# Patient Record
Sex: FEMALE | Race: WHITE | Employment: UNEMPLOYED | URBAN - METROPOLITAN AREA
[De-identification: names, ages, dates, MRNs, and addresses within clinical notes are randomized per-mention and may not be internally consistent; named-entity substitution may affect disease eponyms.]

---

## 2019-01-15 ENCOUNTER — HOSPITAL ENCOUNTER (OUTPATIENT)
Dept: LAB | Age: 44
Discharge: HOME | End: 2019-01-15

## 2019-01-15 LAB — SENTARA SPECIMEN COL,SENBCF: NORMAL

## 2019-01-15 PROCEDURE — 99001 SPECIMEN HANDLING PT-LAB: CPT

## 2019-08-22 ENCOUNTER — HOSPITAL ENCOUNTER (OUTPATIENT)
Age: 44
Discharge: HOME | End: 2019-08-22
Attending: PHYSICIAN ASSISTANT
Payer: COMMERCIAL

## 2019-08-22 DIAGNOSIS — R41.3 MEMORY LOSS, SHORT TERM: ICD-10-CM

## 2019-08-22 PROCEDURE — 74011636320 HC RX REV CODE- 636/320

## 2019-08-22 PROCEDURE — 70553 MRI BRAIN STEM W/O & W/DYE: CPT

## 2019-08-22 PROCEDURE — A9575 INJ GADOTERATE MEGLUMI 0.1ML: HCPCS

## 2019-08-22 RX ADMIN — GADOTERATE MEGLUMINE 15 ML: 376.9 INJECTION INTRAVENOUS at 10:00

## 2020-06-19 ENCOUNTER — HOSPITAL ENCOUNTER (OUTPATIENT)
Dept: VASCULAR SURGERY | Age: 45
Discharge: HOME | End: 2020-06-19
Attending: PHYSICIAN ASSISTANT
Payer: COMMERCIAL

## 2020-06-19 DIAGNOSIS — M79.645 FINGER PAIN, LEFT: ICD-10-CM

## 2020-06-19 PROCEDURE — 93970 EXTREMITY STUDY: CPT

## 2021-09-17 ENCOUNTER — TRANSCRIBE ORDER (OUTPATIENT)
Dept: SCHEDULING | Age: 46
End: 2021-09-17

## 2021-09-17 DIAGNOSIS — Z12.31 ENCOUNTER FOR SCREENING MAMMOGRAM FOR MALIGNANT NEOPLASM OF BREAST: Primary | ICD-10-CM

## 2023-05-12 RX ORDER — HYDROCODONE BITARTRATE AND ACETAMINOPHEN 7.5; 325 MG/1; MG/1
1-2 TABLET ORAL EVERY 6 HOURS PRN
COMMUNITY
Start: 2015-05-05

## 2023-05-12 RX ORDER — LEVOTHYROXINE SODIUM 0.1 MG/1
TABLET ORAL
COMMUNITY

## 2023-05-12 RX ORDER — DEXTROAMPHETAMINE SACCHARATE, AMPHETAMINE ASPARTATE, DEXTROAMPHETAMINE SULFATE AND AMPHETAMINE SULFATE 7.5; 7.5; 7.5; 7.5 MG/1; MG/1; MG/1; MG/1
30 TABLET ORAL
COMMUNITY

## 2024-05-20 ENCOUNTER — APPOINTMENT (OUTPATIENT)
Facility: HOSPITAL | Age: 49
DRG: 309 | End: 2024-05-20
Payer: COMMERCIAL

## 2024-05-20 ENCOUNTER — HOSPITAL ENCOUNTER (INPATIENT)
Facility: HOSPITAL | Age: 49
LOS: 2 days | Discharge: HOME | DRG: 309 | End: 2024-05-22
Attending: FAMILY MEDICINE | Admitting: FAMILY MEDICINE
Payer: COMMERCIAL

## 2024-05-20 DIAGNOSIS — I20.9 ANGINA PECTORIS (HCC): ICD-10-CM

## 2024-05-20 DIAGNOSIS — I42.3 ENDOMYOCARDIAL FIBROSIS (HCC): ICD-10-CM

## 2024-05-20 DIAGNOSIS — I49.8 BIGEMINY: Primary | ICD-10-CM

## 2024-05-20 DIAGNOSIS — R07.9 CHEST PAIN, UNSPECIFIED TYPE: ICD-10-CM

## 2024-05-20 PROBLEM — I49.9 ARRHYTHMIA: Status: ACTIVE | Noted: 2024-05-20

## 2024-05-20 LAB
ALBUMIN SERPL-MCNC: 4 G/DL (ref 3.4–5)
ALBUMIN/GLOB SERPL: 1.3 (ref 0.8–1.7)
ALP SERPL-CCNC: 87 U/L (ref 45–117)
ALT SERPL-CCNC: 27 U/L (ref 13–56)
ANION GAP SERPL CALC-SCNC: 9 MMOL/L (ref 3–18)
AST SERPL-CCNC: 26 U/L (ref 10–38)
BASOPHILS # BLD: 0.1 K/UL (ref 0–0.1)
BASOPHILS NFR BLD: 1 % (ref 0–2)
BILIRUB SERPL-MCNC: 0.6 MG/DL (ref 0.2–1)
BUN SERPL-MCNC: 15 MG/DL (ref 7–18)
BUN/CREAT SERPL: 17 (ref 12–20)
CALCIUM SERPL-MCNC: 9.7 MG/DL (ref 8.5–10.1)
CHLORIDE SERPL-SCNC: 106 MMOL/L (ref 100–111)
CO2 SERPL-SCNC: 25 MMOL/L (ref 21–32)
CREAT SERPL-MCNC: 0.88 MG/DL (ref 0.6–1.3)
D DIMER PPP FEU-MCNC: 0.86 UG/ML(FEU)
DIFFERENTIAL METHOD BLD: ABNORMAL
ECHO AO ASC DIAM: 3 CM
ECHO AO ASCENDING AORTA INDEX: 1.55 CM/M2
ECHO AO ROOT DIAM: 3.3 CM
ECHO AO ROOT INDEX: 1.7 CM/M2
ECHO AV AREA PEAK VELOCITY: 3.7 CM2
ECHO AV AREA VTI: 3.5 CM2
ECHO AV AREA/BSA PEAK VELOCITY: 1.9 CM2/M2
ECHO AV AREA/BSA VTI: 1.8 CM2/M2
ECHO AV MEAN GRADIENT: 3 MMHG
ECHO AV MEAN VELOCITY: 0.8 M/S
ECHO AV PEAK GRADIENT: 5 MMHG
ECHO AV PEAK VELOCITY: 1.1 M/S
ECHO AV VELOCITY RATIO: 1.18
ECHO AV VTI: 24.4 CM
ECHO BSA: 1.94 M2
ECHO LA DIAMETER INDEX: 1.7 CM/M2
ECHO LA DIAMETER: 3.3 CM
ECHO LA TO AORTIC ROOT RATIO: 1
ECHO LA VOL A-L A2C: 41 ML (ref 22–52)
ECHO LA VOL A-L A4C: 49 ML (ref 22–52)
ECHO LA VOL BP: 43 ML (ref 22–52)
ECHO LA VOL MOD A2C: 39 ML (ref 22–52)
ECHO LA VOL MOD A4C: 48 ML (ref 22–52)
ECHO LA VOL/BSA BIPLANE: 22 ML/M2 (ref 16–34)
ECHO LA VOLUME AREA LENGTH: 46 ML
ECHO LA VOLUME INDEX A-L A2C: 21 ML/M2 (ref 16–34)
ECHO LA VOLUME INDEX A-L A4C: 25 ML/M2 (ref 16–34)
ECHO LA VOLUME INDEX AREA LENGTH: 24 ML/M2 (ref 16–34)
ECHO LA VOLUME INDEX MOD A2C: 20 ML/M2 (ref 16–34)
ECHO LA VOLUME INDEX MOD A4C: 25 ML/M2 (ref 16–34)
ECHO LV E' LATERAL VELOCITY: 15 CM/S
ECHO LV E' SEPTAL VELOCITY: 13 CM/S
ECHO LV EDV A2C: 72 ML
ECHO LV EDV A4C: 106 ML
ECHO LV EDV BP: 90 ML (ref 56–104)
ECHO LV EDV INDEX A4C: 55 ML/M2
ECHO LV EDV INDEX BP: 46 ML/M2
ECHO LV EDV NDEX A2C: 37 ML/M2
ECHO LV EJECTION FRACTION A2C: 67 %
ECHO LV EJECTION FRACTION A4C: 60 %
ECHO LV EJECTION FRACTION BIPLANE: 64 % (ref 55–100)
ECHO LV ESV A2C: 24 ML
ECHO LV ESV A4C: 43 ML
ECHO LV ESV BP: 32 ML (ref 19–49)
ECHO LV ESV INDEX A2C: 12 ML/M2
ECHO LV ESV INDEX A4C: 22 ML/M2
ECHO LV ESV INDEX BP: 16 ML/M2
ECHO LV FRACTIONAL SHORTENING: 38 % (ref 28–44)
ECHO LV INTERNAL DIMENSION DIASTOLE INDEX: 2.32 CM/M2
ECHO LV INTERNAL DIMENSION DIASTOLIC: 4.5 CM (ref 3.9–5.3)
ECHO LV INTERNAL DIMENSION SYSTOLIC INDEX: 1.44 CM/M2
ECHO LV INTERNAL DIMENSION SYSTOLIC: 2.8 CM
ECHO LV IVSD: 0.9 CM (ref 0.6–0.9)
ECHO LV MASS 2D: 142.9 G (ref 67–162)
ECHO LV MASS INDEX 2D: 73.7 G/M2 (ref 43–95)
ECHO LV POSTERIOR WALL DIASTOLIC: 1 CM (ref 0.6–0.9)
ECHO LV RELATIVE WALL THICKNESS RATIO: 0.44
ECHO LVOT AREA: 3.5 CM2
ECHO LVOT AV VTI INDEX: 1.05
ECHO LVOT DIAM: 2.1 CM
ECHO LVOT MEAN GRADIENT: 3 MMHG
ECHO LVOT PEAK GRADIENT: 6 MMHG
ECHO LVOT PEAK VELOCITY: 1.3 M/S
ECHO LVOT STROKE VOLUME INDEX: 45.9 ML/M2
ECHO LVOT SV: 89 ML
ECHO LVOT VTI: 25.7 CM
ECHO MV A VELOCITY: 0.51 M/S
ECHO MV E DECELERATION TIME (DT): 154.4 MS
ECHO MV E VELOCITY: 0.68 M/S
ECHO MV E/A RATIO: 1.33
ECHO MV E/E' LATERAL: 4.53
ECHO MV E/E' RATIO (AVERAGED): 4.88
ECHO MV E/E' SEPTAL: 5.23
ECHO RA END SYSTOLIC VOLUME APICAL 4 CHAMBER INDEX BSA: 19 ML/M2
ECHO RA VOLUME: 37 ML
ECHO RV INTERNAL DIMENSION: 3.2 CM
ECHO RV TAPSE: 2.7 CM (ref 1.7–?)
ECHO RVOT MEAN GRADIENT: 1 MMHG
ECHO RVOT PEAK GRADIENT: 2 MMHG
ECHO RVOT PEAK VELOCITY: 0.7 M/S
ECHO RVOT VTI: 17.2 CM
EOSINOPHIL # BLD: 0.1 K/UL (ref 0–0.4)
EOSINOPHIL NFR BLD: 2 % (ref 0–5)
ERYTHROCYTE [DISTWIDTH] IN BLOOD BY AUTOMATED COUNT: 13.8 % (ref 11.6–14.5)
GLOBULIN SER CALC-MCNC: 3.2 G/DL (ref 2–4)
GLUCOSE BLD STRIP.AUTO-MCNC: 89 MG/DL (ref 70–110)
GLUCOSE SERPL-MCNC: 98 MG/DL (ref 74–99)
HCT VFR BLD AUTO: 40.1 % (ref 35–45)
HGB BLD-MCNC: 13.4 G/DL (ref 12–16)
IMM GRANULOCYTES # BLD AUTO: 0 K/UL (ref 0–0.04)
IMM GRANULOCYTES NFR BLD AUTO: 0 % (ref 0–0.5)
LYMPHOCYTES # BLD: 3 K/UL (ref 0.9–3.6)
LYMPHOCYTES NFR BLD: 49 % (ref 21–52)
MAGNESIUM SERPL-MCNC: 1.7 MG/DL (ref 1.6–2.6)
MCH RBC QN AUTO: 28.6 PG (ref 24–34)
MCHC RBC AUTO-ENTMCNC: 33.4 G/DL (ref 31–37)
MCV RBC AUTO: 85.7 FL (ref 78–100)
MONOCYTES # BLD: 0.7 K/UL (ref 0.05–1.2)
MONOCYTES NFR BLD: 11 % (ref 3–10)
NEUTS SEG # BLD: 2.3 K/UL (ref 1.8–8)
NEUTS SEG NFR BLD: 37 % (ref 40–73)
NRBC # BLD: 0 K/UL (ref 0–0.01)
NRBC BLD-RTO: 0 PER 100 WBC
PLATELET # BLD AUTO: 324 K/UL (ref 135–420)
PMV BLD AUTO: 9 FL (ref 9.2–11.8)
POTASSIUM SERPL-SCNC: 3.2 MMOL/L (ref 3.5–5.5)
PROT SERPL-MCNC: 7.2 G/DL (ref 6.4–8.2)
RBC # BLD AUTO: 4.68 M/UL (ref 4.2–5.3)
SODIUM SERPL-SCNC: 140 MMOL/L (ref 136–145)
TROPONIN I SERPL HS-MCNC: 4 NG/L (ref 0–54)
TROPONIN I SERPL HS-MCNC: <3 NG/L (ref 0–54)
TROPONIN I SERPL HS-MCNC: <3 NG/L (ref 0–54)
TSH SERPL DL<=0.05 MIU/L-ACNC: 0.82 UIU/ML (ref 0.36–3.74)
WBC # BLD AUTO: 6.1 K/UL (ref 4.6–13.2)

## 2024-05-20 PROCEDURE — 36415 COLL VENOUS BLD VENIPUNCTURE: CPT

## 2024-05-20 PROCEDURE — 83735 ASSAY OF MAGNESIUM: CPT

## 2024-05-20 PROCEDURE — 71046 X-RAY EXAM CHEST 2 VIEWS: CPT

## 2024-05-20 PROCEDURE — 82962 GLUCOSE BLOOD TEST: CPT

## 2024-05-20 PROCEDURE — 1100000003 HC PRIVATE W/ TELEMETRY

## 2024-05-20 PROCEDURE — 93306 TTE W/DOPPLER COMPLETE: CPT

## 2024-05-20 PROCEDURE — 80053 COMPREHEN METABOLIC PANEL: CPT

## 2024-05-20 PROCEDURE — 85379 FIBRIN DEGRADATION QUANT: CPT

## 2024-05-20 PROCEDURE — 80061 LIPID PANEL: CPT

## 2024-05-20 PROCEDURE — 84484 ASSAY OF TROPONIN QUANT: CPT

## 2024-05-20 PROCEDURE — 84443 ASSAY THYROID STIM HORMONE: CPT

## 2024-05-20 PROCEDURE — 6370000000 HC RX 637 (ALT 250 FOR IP): Performed by: PHYSICIAN ASSISTANT

## 2024-05-20 PROCEDURE — 84436 ASSAY OF TOTAL THYROXINE: CPT

## 2024-05-20 PROCEDURE — 2580000003 HC RX 258: Performed by: FAMILY MEDICINE

## 2024-05-20 PROCEDURE — 6370000000 HC RX 637 (ALT 250 FOR IP): Performed by: FAMILY MEDICINE

## 2024-05-20 PROCEDURE — 85025 COMPLETE CBC W/AUTO DIFF WBC: CPT

## 2024-05-20 PROCEDURE — 6360000004 HC RX CONTRAST MEDICATION: Performed by: PHYSICIAN ASSISTANT

## 2024-05-20 PROCEDURE — 99285 EMERGENCY DEPT VISIT HI MDM: CPT

## 2024-05-20 PROCEDURE — 93005 ELECTROCARDIOGRAM TRACING: CPT | Performed by: FAMILY MEDICINE

## 2024-05-20 PROCEDURE — 71275 CT ANGIOGRAPHY CHEST: CPT

## 2024-05-20 PROCEDURE — 6360000002 HC RX W HCPCS: Performed by: FAMILY MEDICINE

## 2024-05-20 PROCEDURE — 93005 ELECTROCARDIOGRAM TRACING: CPT | Performed by: PHYSICIAN ASSISTANT

## 2024-05-20 RX ORDER — ACETAMINOPHEN 325 MG/1
650 TABLET ORAL EVERY 6 HOURS PRN
Status: DISCONTINUED | OUTPATIENT
Start: 2024-05-20 | End: 2024-05-22 | Stop reason: HOSPADM

## 2024-05-20 RX ORDER — ACETAMINOPHEN 650 MG/1
650 SUPPOSITORY RECTAL EVERY 6 HOURS PRN
Status: DISCONTINUED | OUTPATIENT
Start: 2024-05-20 | End: 2024-05-22 | Stop reason: HOSPADM

## 2024-05-20 RX ORDER — MAGNESIUM SULFATE IN WATER 40 MG/ML
2000 INJECTION, SOLUTION INTRAVENOUS PRN
Status: DISCONTINUED | OUTPATIENT
Start: 2024-05-20 | End: 2024-05-22 | Stop reason: HOSPADM

## 2024-05-20 RX ORDER — ATORVASTATIN CALCIUM 20 MG/1
40 TABLET, FILM COATED ORAL NIGHTLY
Status: DISCONTINUED | OUTPATIENT
Start: 2024-05-20 | End: 2024-05-22 | Stop reason: HOSPADM

## 2024-05-20 RX ORDER — POLYETHYLENE GLYCOL 3350 17 G/17G
17 POWDER, FOR SOLUTION ORAL DAILY PRN
Status: DISCONTINUED | OUTPATIENT
Start: 2024-05-20 | End: 2024-05-22 | Stop reason: HOSPADM

## 2024-05-20 RX ORDER — POTASSIUM CHLORIDE 20 MEQ/1
40 TABLET, EXTENDED RELEASE ORAL PRN
Status: DISCONTINUED | OUTPATIENT
Start: 2024-05-20 | End: 2024-05-22 | Stop reason: HOSPADM

## 2024-05-20 RX ORDER — ONDANSETRON 2 MG/ML
4 INJECTION INTRAMUSCULAR; INTRAVENOUS EVERY 6 HOURS PRN
Status: DISCONTINUED | OUTPATIENT
Start: 2024-05-20 | End: 2024-05-22 | Stop reason: HOSPADM

## 2024-05-20 RX ORDER — POTASSIUM CHLORIDE 7.45 MG/ML
10 INJECTION INTRAVENOUS PRN
Status: DISCONTINUED | OUTPATIENT
Start: 2024-05-20 | End: 2024-05-22 | Stop reason: HOSPADM

## 2024-05-20 RX ORDER — SODIUM CHLORIDE 0.9 % (FLUSH) 0.9 %
5-40 SYRINGE (ML) INJECTION PRN
Status: DISCONTINUED | OUTPATIENT
Start: 2024-05-20 | End: 2024-05-22 | Stop reason: HOSPADM

## 2024-05-20 RX ORDER — MORPHINE SULFATE 4 MG/ML
4 INJECTION, SOLUTION INTRAMUSCULAR; INTRAVENOUS ONCE
Status: DISCONTINUED | OUTPATIENT
Start: 2024-05-20 | End: 2024-05-22 | Stop reason: HOSPADM

## 2024-05-20 RX ORDER — SODIUM CHLORIDE 0.9 % (FLUSH) 0.9 %
5-40 SYRINGE (ML) INJECTION EVERY 12 HOURS SCHEDULED
Status: DISCONTINUED | OUTPATIENT
Start: 2024-05-20 | End: 2024-05-22 | Stop reason: HOSPADM

## 2024-05-20 RX ORDER — NITROGLYCERIN 0.4 MG/1
0.4 TABLET SUBLINGUAL EVERY 5 MIN PRN
Status: DISCONTINUED | OUTPATIENT
Start: 2024-05-20 | End: 2024-05-22 | Stop reason: HOSPADM

## 2024-05-20 RX ORDER — SODIUM CHLORIDE 9 MG/ML
INJECTION, SOLUTION INTRAVENOUS PRN
Status: DISCONTINUED | OUTPATIENT
Start: 2024-05-20 | End: 2024-05-22 | Stop reason: HOSPADM

## 2024-05-20 RX ORDER — ENOXAPARIN SODIUM 100 MG/ML
40 INJECTION SUBCUTANEOUS DAILY
Status: DISCONTINUED | OUTPATIENT
Start: 2024-05-20 | End: 2024-05-22 | Stop reason: HOSPADM

## 2024-05-20 RX ORDER — ASPIRIN 81 MG/1
81 TABLET, CHEWABLE ORAL DAILY
Status: DISCONTINUED | OUTPATIENT
Start: 2024-05-20 | End: 2024-05-22 | Stop reason: HOSPADM

## 2024-05-20 RX ORDER — ONDANSETRON 4 MG/1
4 TABLET, ORALLY DISINTEGRATING ORAL EVERY 8 HOURS PRN
Status: DISCONTINUED | OUTPATIENT
Start: 2024-05-20 | End: 2024-05-22 | Stop reason: HOSPADM

## 2024-05-20 RX ORDER — ASPIRIN 81 MG/1
162 TABLET, CHEWABLE ORAL
Status: COMPLETED | OUTPATIENT
Start: 2024-05-20 | End: 2024-05-20

## 2024-05-20 RX ADMIN — ASPIRIN 81 MG: 81 TABLET, CHEWABLE ORAL at 18:47

## 2024-05-20 RX ADMIN — ENOXAPARIN SODIUM 40 MG: 100 INJECTION SUBCUTANEOUS at 18:47

## 2024-05-20 RX ADMIN — IOPAMIDOL 100 ML: 755 INJECTION, SOLUTION INTRAVENOUS at 12:45

## 2024-05-20 RX ADMIN — ASPIRIN 162 MG: 81 TABLET, CHEWABLE ORAL at 12:58

## 2024-05-20 RX ADMIN — POTASSIUM CHLORIDE 40 MEQ: 1500 TABLET, EXTENDED RELEASE ORAL at 20:23

## 2024-05-20 RX ADMIN — SODIUM CHLORIDE, PRESERVATIVE FREE 10 ML: 5 INJECTION INTRAVENOUS at 20:15

## 2024-05-20 ASSESSMENT — LIFESTYLE VARIABLES
HOW OFTEN DO YOU HAVE A DRINK CONTAINING ALCOHOL: NEVER
HOW MANY STANDARD DRINKS CONTAINING ALCOHOL DO YOU HAVE ON A TYPICAL DAY: 1 OR 2

## 2024-05-20 NOTE — CONSULTS
05/20/2024 10:52 AM    K 3.2 05/20/2024 10:52 AM     05/20/2024 10:52 AM    CO2 25 05/20/2024 10:52 AM    BUN 15 05/20/2024 10:52 AM     No results found for: \"CHOL\", \"CHLST\", \"CHOLV\", \"HDL\", \"HDLC\", \"LDL\"  No components found for: \"GPT\"  No results found for: \"LABA1C\"    RADIOLOGY:  [unfilled]  [unfilled]      Cardiology Procedures: [unfilled] [unfilled] Cardiolite (Tc-99m Sestamibi) stress test        Impression / Plan:    Patient Active Problem List   Diagnosis    Breast mass    Fibrocystic breast changes of both breasts       A/P  Chest pain  Palpitation  Dizziness  Dyspnea on exertion  PVCs  History of paroxysmal tachycardia      Plan.  Check TSH  Will get echocardiogram  Will get exercise/Lexiscan nuclear stress test  Will start metoprolol succinate 25 mg daily after the stress test  Patient may need outpatient Holter monitor depending upon on clinical progression  I have discussed in detail with the patient and family management plan  All of them agreed with the above plan  Thank you for allowing me to participate in the management of this pleasant patient.  Please feel free to contact me if you have any questions or concerns.        Signed By: JASMYN SAUCEDO MD     May 20, 2024

## 2024-05-20 NOTE — H&P
MV E Wave Deceleration Time 154.4 ms    MV E Velocity 0.68 m/s    LV E' Lateral Velocity 15 cm/s    LV E' Septal Velocity 13 cm/s    TAPSE 2.7 1.7 cm    Ascending Aorta 3.0 cm    Aortic Root 3.3 cm    Fractional Shortening 2D 38 28 - 44 %    LV ESV Index BP 16 mL/m2    LV EDV Index BP 46 mL/m2    LV ESV Index A4C 22 mL/m2    LV EDV Index A4C 55 mL/m2    LV ESV Index A2C 12 mL/m2    LV EDV Index A2C 37 mL/m2    LVIDd Index 2.32 cm/m2    LVIDs Index 1.44 cm/m2    LV RWT Ratio 0.44     LV Mass 2D 142.9 67 - 162 g    LV Mass 2D Index 73.7 43 - 95 g/m2    MV E/A 1.33     E/E' Ratio (Averaged) 4.88     E/E' Lateral 4.53     E/E' Septal 5.23     LA Volume Index BP 22 16 - 34 ml/m2    LA Volume Index A/L 24 16 - 34 mL/m2    LVOT Stroke Volume Index 45.9 mL/m2    LVOT Area 3.5 cm2    LA Volume Index A-L A2C 21 16 - 34 mL/m2    LA Volume Index A-L A4C 25 16 - 34 mL/m2    LA Volume Index MOD A2C 20 16 - 34 ml/m2    LA Volume Index MOD A4C 25 16 - 34 ml/m2    LA Size Index 1.70 cm/m2    LA/AO Root Ratio 1.00     Ao Root Index 1.70 cm/m2    Ascending Aorta Index 1.55 cm/m2    AV Velocity Ratio 1.18     LVOT:AV VTI Index 1.05     PEYTON/BSA VTI 1.8 cm2/m2    PEYTON/BSA Peak Velocity 1.9 cm2/m2    RA Volume Index A4C 19 mL/m2   Troponin    Collection Time: 05/20/24  8:33 PM   Result Value Ref Range    Troponin, High Sensitivity 4 0 - 54 ng/L   CBC    Collection Time: 05/21/24  2:21 AM   Result Value Ref Range    WBC 5.3 4.6 - 13.2 K/uL    RBC 4.14 (L) 4.20 - 5.30 M/uL    Hemoglobin 11.9 (L) 12.0 - 16.0 g/dL    Hematocrit 35.9 35.0 - 45.0 %    MCV 86.7 78.0 - 100.0 FL    MCH 28.7 24.0 - 34.0 PG    MCHC 33.1 31.0 - 37.0 g/dL    RDW 14.1 11.6 - 14.5 %    Platelets 275 135 - 420 K/uL    MPV 9.0 (L) 9.2 - 11.8 FL    Nucleated RBCs 0.0 0  WBC    nRBC 0.00 0.00 - 0.01 K/uL   Basic Metabolic Panel w/ Reflex to MG    Collection Time: 05/21/24  2:21 AM   Result Value Ref Range    Sodium 139 136 - 145 mmol/L    Potassium 4.2 3.5 - 5.5

## 2024-05-20 NOTE — ED PROVIDER NOTES
skin trauma  Neurological: neg for headaches  All other systems reviewed negative with exception of positives in ROS and HPI.   Past Medical History:  Past Medical History:   Diagnosis Date    PAT (paroxysmal atrial tachycardia) (HCC)        Past Surgical History:  Past Surgical History:   Procedure Laterality Date    BREAST SURGERY  1996    ORTHOPEDIC SURGERY      SD UNLISTED PROCEDURE ABDOMEN PERITONEUM & OMENTUM      hernia repair as a child        Family History:  History reviewed. No pertinent family history.    Social History:  Social History     Tobacco Use    Smoking status: Former   Substance Use Topics    Alcohol use: Yes       Allergies:  No Known Allergies    Vital Signs:  Vitals:    05/20/24 1300 05/20/24 1330 05/20/24 1400 05/20/24 1415   BP: 138/79 114/81 125/76 122/65   Pulse: 65 57 54 60   Resp: 15 11 13 13   Temp:       SpO2: 100% 98% 100% 100%   Weight:       Height:         Physical Exam:  Vital Signs Reviewed. Nursing Notes Reviewed.  Constitutional:  Well developed, well nourished patient. Appearance and behavior are age and situation appropriate. Anxious appearing.  Head: Normocephalic, Atraumatic  Eyes: Conjunctiva clear, lids normal. Sclera anicteric.   ENT:hearing grossly intact  Neck:  supple, FROM, nontender, no bruit  Lungs: No respiratory distress. Lungs CTAB . No cough. No pain with inspiration.  CV:  RR&R without murmur  Thorax: no chest wall tenderness.No signs of trauma. Skin without rash.  Abdomen: soft, no tenderness with palpation. BS normal throughout. No organomegaly appreciated. No mass.   Extremities:  no tenderness with palpation to UE or LE.  No pedal edema. Normal cap refill, bilat equal.   Neuro:  A&O x 3. CN II-XII grossly intact. No gross neuro deficits.  Skin:  Warm, dry, no rash. Skin intact.   Spine:  No pain with palpation over cervical, thoracic or lumbar spine.    Diagnostics:    Labs -     Recent Results (from the past 12 hour(s))   EKG 12 Lead    Collection

## 2024-05-20 NOTE — ED TRIAGE NOTES
Dizziness (Pt stated that she woke up this morning because of having PVC'S. Pt stated that she was at work and had some Pvcs and was dizzy )     (Shared from Triage, RN Chief Complaint AIDA Mandujano)

## 2024-05-21 ENCOUNTER — HOSPITAL ENCOUNTER (INPATIENT)
Facility: HOSPITAL | Age: 49
Discharge: HOME | DRG: 309 | End: 2024-05-23
Payer: COMMERCIAL

## 2024-05-21 ENCOUNTER — APPOINTMENT (OUTPATIENT)
Facility: HOSPITAL | Age: 49
DRG: 309 | End: 2024-05-21
Payer: COMMERCIAL

## 2024-05-21 PROBLEM — E03.9 HYPOTHYROIDISM: Status: ACTIVE | Noted: 2024-05-20

## 2024-05-21 LAB
ANION GAP SERPL CALC-SCNC: 5 MMOL/L (ref 3–18)
BUN SERPL-MCNC: 16 MG/DL (ref 7–18)
BUN/CREAT SERPL: 17 (ref 12–20)
CALCIUM SERPL-MCNC: 8.5 MG/DL (ref 8.5–10.1)
CHLORIDE SERPL-SCNC: 108 MMOL/L (ref 100–111)
CHOLEST SERPL-MCNC: 151 MG/DL
CO2 SERPL-SCNC: 26 MMOL/L (ref 21–32)
CREAT SERPL-MCNC: 0.93 MG/DL (ref 0.6–1.3)
ECHO BSA: 1.94 M2
ERYTHROCYTE [DISTWIDTH] IN BLOOD BY AUTOMATED COUNT: 14.1 % (ref 11.6–14.5)
GLUCOSE SERPL-MCNC: 98 MG/DL (ref 74–99)
HCT VFR BLD AUTO: 35.9 % (ref 35–45)
HDLC SERPL-MCNC: 78 MG/DL (ref 40–60)
HDLC SERPL: 1.9 (ref 0–5)
HGB BLD-MCNC: 11.9 G/DL (ref 12–16)
LDLC SERPL CALC-MCNC: 62.8 MG/DL (ref 0–100)
LIPID PANEL: ABNORMAL
MAGNESIUM SERPL-MCNC: 1.8 MG/DL (ref 1.6–2.6)
MCH RBC QN AUTO: 28.7 PG (ref 24–34)
MCHC RBC AUTO-ENTMCNC: 33.1 G/DL (ref 31–37)
MCV RBC AUTO: 86.7 FL (ref 78–100)
NRBC # BLD: 0 K/UL (ref 0–0.01)
NRBC BLD-RTO: 0 PER 100 WBC
NUC STRESS EJECTION FRACTION: 62 %
PLATELET # BLD AUTO: 275 K/UL (ref 135–420)
PMV BLD AUTO: 9 FL (ref 9.2–11.8)
POTASSIUM SERPL-SCNC: 4.2 MMOL/L (ref 3.5–5.5)
RBC # BLD AUTO: 4.14 M/UL (ref 4.2–5.3)
SODIUM SERPL-SCNC: 139 MMOL/L (ref 136–145)
STRESS BASELINE ST DEPRESSION: 0 MM
STRESS ST DEPRESSION: 0 MM
STRESS TARGET HR: 171 BPM
T4 SERPL-MCNC: 12 UG/DL (ref 4.8–13.9)
TID: 1.03
TRIGL SERPL-MCNC: 51 MG/DL
VLDLC SERPL CALC-MCNC: 10.2 MG/DL
WBC # BLD AUTO: 5.3 K/UL (ref 4.6–13.2)

## 2024-05-21 PROCEDURE — 83735 ASSAY OF MAGNESIUM: CPT

## 2024-05-21 PROCEDURE — A9500 TC99M SESTAMIBI: HCPCS | Performed by: INTERNAL MEDICINE

## 2024-05-21 PROCEDURE — 93017 CV STRESS TEST TRACING ONLY: CPT

## 2024-05-21 PROCEDURE — 80048 BASIC METABOLIC PNL TOTAL CA: CPT

## 2024-05-21 PROCEDURE — 36415 COLL VENOUS BLD VENIPUNCTURE: CPT

## 2024-05-21 PROCEDURE — 6360000002 HC RX W HCPCS: Performed by: FAMILY MEDICINE

## 2024-05-21 PROCEDURE — 2580000003 HC RX 258: Performed by: FAMILY MEDICINE

## 2024-05-21 PROCEDURE — 85027 COMPLETE CBC AUTOMATED: CPT

## 2024-05-21 PROCEDURE — 6370000000 HC RX 637 (ALT 250 FOR IP): Performed by: FAMILY MEDICINE

## 2024-05-21 PROCEDURE — 3430000000 HC RX DIAGNOSTIC RADIOPHARMACEUTICAL: Performed by: INTERNAL MEDICINE

## 2024-05-21 PROCEDURE — 1100000003 HC PRIVATE W/ TELEMETRY

## 2024-05-21 RX ORDER — LEVOTHYROXINE SODIUM 0.1 MG/1
100 TABLET ORAL DAILY
Status: DISCONTINUED | OUTPATIENT
Start: 2024-05-21 | End: 2024-05-22 | Stop reason: HOSPADM

## 2024-05-21 RX ORDER — TETRAKIS(2-METHOXYISOBUTYLISOCYANIDE)COPPER(I) TETRAFLUOROBORATE 1 MG/ML
36 INJECTION, POWDER, LYOPHILIZED, FOR SOLUTION INTRAVENOUS
Status: COMPLETED | OUTPATIENT
Start: 2024-05-21 | End: 2024-05-21

## 2024-05-21 RX ORDER — REGADENOSON 0.08 MG/ML
0.4 INJECTION, SOLUTION INTRAVENOUS
Status: DISCONTINUED | OUTPATIENT
Start: 2024-05-21 | End: 2024-05-21

## 2024-05-21 RX ORDER — TETRAKIS(2-METHOXYISOBUTYLISOCYANIDE)COPPER(I) TETRAFLUOROBORATE 1 MG/ML
11.6 INJECTION, POWDER, LYOPHILIZED, FOR SOLUTION INTRAVENOUS
Status: COMPLETED | OUTPATIENT
Start: 2024-05-21 | End: 2024-05-21

## 2024-05-21 RX ADMIN — TETRAKIS(2-METHOXYISOBUTYLISOCYANIDE)COPPER(I) TETRAFLUOROBORATE 11.6 MILLICURIE: 1 INJECTION, POWDER, LYOPHILIZED, FOR SOLUTION INTRAVENOUS at 08:48

## 2024-05-21 RX ADMIN — ASPIRIN 81 MG: 81 TABLET, CHEWABLE ORAL at 07:40

## 2024-05-21 RX ADMIN — ENOXAPARIN SODIUM 40 MG: 100 INJECTION SUBCUTANEOUS at 08:30

## 2024-05-21 RX ADMIN — LEVOTHYROXINE SODIUM 100 MCG: 0.1 TABLET ORAL at 06:45

## 2024-05-21 RX ADMIN — TETRAKIS(2-METHOXYISOBUTYLISOCYANIDE)COPPER(I) TETRAFLUOROBORATE 36 MILLICURIE: 1 INJECTION, POWDER, LYOPHILIZED, FOR SOLUTION INTRAVENOUS at 10:38

## 2024-05-21 RX ADMIN — SODIUM CHLORIDE, PRESERVATIVE FREE 10 ML: 5 INJECTION INTRAVENOUS at 07:41

## 2024-05-21 ASSESSMENT — PAIN SCALES - GENERAL
PAINLEVEL_OUTOF10: 0

## 2024-05-21 NOTE — PLAN OF CARE
Problem: ABCDS Injury Assessment  Goal: Absence of physical injury  Outcome: Progressing     Problem: Discharge Planning  Goal: Discharge to home or other facility with appropriate resources  Outcome: Progressing  Flowsheets  Taken 5/20/2024 1915 by Louisa Coyle RN  Discharge to home or other facility with appropriate resources: Identify barriers to discharge with patient and caregiver  Taken 5/20/2024 1804 by Josie Caro RN  Discharge to home or other facility with appropriate resources:   Identify barriers to discharge with patient and caregiver   Arrange for needed discharge resources and transportation as appropriate     Problem: Safety - Adult  Goal: Free from fall injury  Outcome: Progressing

## 2024-05-21 NOTE — PROGRESS NOTES
Pt has several family members present at bedside. Pt is sitting comfortably in the chair at this time with no complaints. Pt and family currently have questions for Cardiology. They state that they have \"not seen anyone today at all\" other than during at the stress test and the current unit staff. They would like to know details of test results and pt is eager to DC and states she is hoping to go to work tomorrow. Charge RN and myself provided education and addressed the concerns of pt to the best of our knowledge. We reassured pt and family that they will see physicians in the am and that we will keep them posted with any updates.

## 2024-05-21 NOTE — PROGRESS NOTES
Bedside and Verbal shift change report given to AIDA Jasso (oncoming nurse) by AIDA Galindo (offgoing nurse). Report included the following information Nurse Handoff Report, Adult Overview, Intake/Output, and MAR.

## 2024-05-21 NOTE — PROGRESS NOTES
Bedside and Verbal shift change report given to AIDA Regalado (oncoming nurse) by AIDA Jasso (offgoing nurse). Report included the following information Nurse Handoff Report, Index, ED Encounter Summary, ED SBAR, Adult Overview, Intake/Output, MAR, Recent Results, and Med Rec Status.

## 2024-05-21 NOTE — PLAN OF CARE
Problem: ABCDS Injury Assessment  Goal: Absence of physical injury  5/21/2024 0940 by Sabine Noonan RN  Outcome: Progressing  5/20/2024 2200 by Louisa Coyle RN  Outcome: Progressing     Problem: Discharge Planning  Goal: Discharge to home or other facility with appropriate resources  5/21/2024 0940 by Sabine Noonan RN  Outcome: Progressing  Flowsheets (Taken 5/21/2024 0740)  Discharge to home or other facility with appropriate resources:   Identify barriers to discharge with patient and caregiver   Arrange for needed discharge resources and transportation as appropriate  5/20/2024 2200 by Louisa Coyle RN  Outcome: Progressing  Flowsheets  Taken 5/20/2024 1915 by Louisa Coyle RN  Discharge to home or other facility with appropriate resources: Identify barriers to discharge with patient and caregiver  Taken 5/20/2024 1804 by Josie Caro RN  Discharge to home or other facility with appropriate resources:   Identify barriers to discharge with patient and caregiver   Arrange for needed discharge resources and transportation as appropriate     Problem: Safety - Adult  Goal: Free from fall injury  5/21/2024 0940 by Sabine Noonan RN  Outcome: Progressing  5/20/2024 2200 by Loiusa Coyle RN  Outcome: Progressing     Problem: Pain  Goal: Verbalizes/displays adequate comfort level or baseline comfort level  Outcome: Progressing      Statement Selected

## 2024-05-21 NOTE — PROGRESS NOTES
Hospitalist Progress Note    Patient: Carrie Carson MRN: [id number]  CSN: [id number]    YOB: 1975  Age: 48 y.o.  Sex: female    DOA: 5/20/2024 LOS:  LOS: 1 day                Assessment/Plan     Active Hospital Problems    Diagnosis     Arrhythmia [I49.9]     Hypothyroidism [E03.9]         Chief complaint :  Palpitations    Palpitations -  EKG with frequent PVCs in bigeminy pattern.  Cardiology following  Follow echo and stress test  Further recommendation per cardiology    Hypothyroidism -  Continue synthroid  TSH, T4 in normal range.    DVT prophylaxis with lovenox    Disposition : 1-2 days    Review of systems  General: No fevers or chills.  Cardiovascular: No chest pain or pressure. No palpitations.   Pulmonary: No shortness of breath.   Gastrointestinal: No nausea, vomiting.     Physical Exam:  General: Awake, cooperative, no acute distress    HEENT: NC, Atraumatic.  PERRLA, anicteric sclerae.  Lungs: CTA Bilaterally. No Wheezing/Rhonchi/Rales.  Heart:  S1 S2,  No murmur, No Rubs, No Gallops  Abdomen: Soft, Non distended, Non tender.  +Bowel sounds,   Extremities: No c/c/e  Psych:   Not anxious or agitated.  Neurologic:  No acute neurological deficit.         Vital signs/Intake and Output:  Visit Vitals  /69   Pulse 65   Temp 98.2 °F (36.8 °C) (Temporal)   Resp 18   Ht 1.803 m (5' 11\")   Wt 74.8 kg (165 lb)   SpO2 98%   BMI 23.01 kg/m²     Current Shift:  No intake/output data recorded.  Last three shifts:  No intake/output data recorded.            Labs: Results:       Chemistry Recent Labs     05/20/24 1052 05/21/24 0221    139   K 3.2* 4.2    108   CO2 25 26   BUN 15 16   GLOB 3.2  --    ,No results found for: \"LACTA\"   CBC w/Diff Recent Labs     05/20/24 1052 05/21/24 0221   WBC 6.1 5.3   RBC 4.68 4.14*   HGB 13.4 11.9*   HCT 40.1 35.9    275      Cardiac Enzymes Lab Results   Component Value Date    TROPHS 4 05/20/2024   ,No results found for: \"BNP\"

## 2024-05-21 NOTE — CARE COORDINATION
05/21/24 0914   Service Assessment   Patient Orientation Alert and Oriented   Cognition Alert   History Provided By Patient   Primary Caregiver Self   Accompanied By/Relationship N/A   Support Systems Spouse/Significant Other   PCP Verified by CM Yes   Last Visit to PCP Within last 6 months   Prior Functional Level Independent in ADLs/IADLs   Current Functional Level Independent in ADLs/IADLs   Can patient return to prior living arrangement Yes   Ability to make needs known: Good   Family able to assist with home care needs: Yes   Would you like for me to discuss the discharge plan with any other family members/significant others, and if so, who? Yes  (Significant other)   Financial Resources None   Community Resources None   Social/Functional History   Lives With Significant other   Type of Home House   Home Equipment None   ADL Assistance Independent   Homemaking Assistance Independent   Ambulation Assistance Independent   Transfer Assistance Independent   Active  Yes   Mode of Transportation Car   Occupation Full time employment   Discharge Planning   Type of Residence House   Living Arrangements Spouse/Significant Other   Current Services Prior To Admission None   Potential Assistance Needed N/A   DME Ordered? No   Potential Assistance Purchasing Medications No   Type of Home Care Services None   Patient expects to be discharged to: House   History of falls? 0   Services At/After Discharge   Transition of Care Consult (CM Consult) Discharge Planning   Services At/After Discharge None   [place] Resource Information Provided? No   Mode of Transport at Discharge Other (see comment)  (Family)   Confirm Follow Up Transport Self   Condition of Participation: Discharge Planning   The Plan for Transition of Care is related to the following treatment goals: The patient states after her stress test she would like to discharge home   The Patient and/or Patient Representative was provided with a Choice of Provider?

## 2024-05-22 VITALS
BODY MASS INDEX: 23.1 KG/M2 | HEART RATE: 65 BPM | HEIGHT: 71 IN | WEIGHT: 165 LBS | DIASTOLIC BLOOD PRESSURE: 72 MMHG | SYSTOLIC BLOOD PRESSURE: 114 MMHG | TEMPERATURE: 97.2 F | RESPIRATION RATE: 18 BRPM | OXYGEN SATURATION: 100 %

## 2024-05-22 LAB
ANION GAP SERPL CALC-SCNC: 8 MMOL/L (ref 3–18)
BUN SERPL-MCNC: 12 MG/DL (ref 7–18)
BUN/CREAT SERPL: 16 (ref 12–20)
CALCIUM SERPL-MCNC: 8.6 MG/DL (ref 8.5–10.1)
CHLORIDE SERPL-SCNC: 107 MMOL/L (ref 100–111)
CO2 SERPL-SCNC: 24 MMOL/L (ref 21–32)
CREAT SERPL-MCNC: 0.74 MG/DL (ref 0.6–1.3)
GLUCOSE SERPL-MCNC: 93 MG/DL (ref 74–99)
POTASSIUM SERPL-SCNC: 4.3 MMOL/L (ref 3.5–5.5)
SODIUM SERPL-SCNC: 139 MMOL/L (ref 136–145)

## 2024-05-22 PROCEDURE — 80048 BASIC METABOLIC PNL TOTAL CA: CPT

## 2024-05-22 PROCEDURE — 6370000000 HC RX 637 (ALT 250 FOR IP): Performed by: FAMILY MEDICINE

## 2024-05-22 PROCEDURE — 2580000003 HC RX 258: Performed by: FAMILY MEDICINE

## 2024-05-22 PROCEDURE — 36415 COLL VENOUS BLD VENIPUNCTURE: CPT

## 2024-05-22 PROCEDURE — 6360000002 HC RX W HCPCS: Performed by: FAMILY MEDICINE

## 2024-05-22 RX ADMIN — ENOXAPARIN SODIUM 40 MG: 100 INJECTION SUBCUTANEOUS at 08:49

## 2024-05-22 RX ADMIN — ASPIRIN 81 MG: 81 TABLET, CHEWABLE ORAL at 08:48

## 2024-05-22 RX ADMIN — SODIUM CHLORIDE, PRESERVATIVE FREE 10 ML: 5 INJECTION INTRAVENOUS at 08:53

## 2024-05-22 RX ADMIN — LEVOTHYROXINE SODIUM 100 MCG: 0.1 TABLET ORAL at 08:49

## 2024-05-22 NOTE — PROGRESS NOTES
Awaiting cardiology clearance for DC. Spouse at bedside and has several concerns regarding medical plan and communication. SW reached out to MD to make aware of concerns as well as manager. RN did speak with patient and family regarding concerns and made them aware of what the process is in acute setting. Spouse to transport patient home when stable. SW to follow.

## 2024-05-22 NOTE — PLAN OF CARE
Problem: ABCDS Injury Assessment  Goal: Absence of physical injury  Outcome: Adequate for Discharge     Problem: Discharge Planning  Goal: Discharge to home or other facility with appropriate resources  Outcome: Adequate for Discharge     Problem: Safety - Adult  Goal: Free from fall injury  Outcome: Adequate for Discharge     Problem: Pain  Goal: Verbalizes/displays adequate comfort level or baseline comfort level  Outcome: Adequate for Discharge

## 2024-05-22 NOTE — DISCHARGE SUMMARY
RBC 4.68 4.14*   HGB 13.4 11.9*   HCT 40.1 35.9    275      Cardiac Enzymes No results for input(s): \"CPK\" in the last 72 hours.    Invalid input(s): \"CKRMB\", \"CKND1\", \"TROIP\", \"RUBEN\"   Coagulation No results for input(s): \"INR\", \"APTT\" in the last 72 hours.    Invalid input(s): \"PTP\"    Lipid Panel Lab Results   Component Value Date/Time    CHOL 151 05/20/2024 10:52 AM    HDL 78 05/20/2024 10:52 AM    LDL 62.8 05/20/2024 10:52 AM    VLDL 10.2 05/20/2024 10:52 AM      BNP Invalid input(s): \"BNPP\"   Liver Enzymes No results for input(s): \"TP\" in the last 72 hours.    Invalid input(s): \"ALB\", \"TBIL\", \"AP\", \"SGOT\", \"GPT\", \"DBIL\"   Thyroid Studies No results found for: \"T4\", \"T3RU\", \"TSH\"         Significant Diagnostic Studies: Nuclear stress test with myocardial perfusion    Result Date: 5/21/2024    Stress Combined Conclusion: The study is negative for myocardial ischemia. Findings suggest a low risk of cardiac events.   Stress Function: Left ventricular function post-stress is normal. Post-stress ejection fraction is 62%.   Perfusion Comments: LV perfusion is normal. There is no evidence of inducible ischemia.   Perfusion Conclusion: There is no evidence of transient ischemic dilation (TID).   Image quality is good.   ECG: Resting ECG demonstrates normal sinus rhythm.   Stress Test: A Blaine protocol stress test was performed. The patient reported no chest pain during the stress test. Hemodynamics are adequate for diagnosis. Blood pressure demonstrated a normal response and heart rate demonstrated a normal response to stress. Negative exercise nuclear stress test for detection of ischemia.  Resting EKG sinus rhythm with rare isolated PVCs. Patient exercised on the treadmill using the Blaine protocol for 9.18 minute without chest pain. No ischemic EKG changes during stress test. Patient have very rare isolated symptomatic PAC/PVC during the stress test. No evidence of any SVT, VT or atrial fibrillation Nuclear

## 2024-05-22 NOTE — PROGRESS NOTES
Discharge summary reviewed with pt., education complete. All patient questions answered. Patient signed discharge summary. Pt. Transported off of unit via wheelchair accompanied by transport staff with all belongings.

## 2024-05-23 LAB
EKG ATRIAL RATE: 55 BPM
EKG ATRIAL RATE: 66 BPM
EKG DIAGNOSIS: NORMAL
EKG DIAGNOSIS: NORMAL
EKG P AXIS: 78 DEGREES
EKG P AXIS: 81 DEGREES
EKG P-R INTERVAL: 126 MS
EKG P-R INTERVAL: 132 MS
EKG Q-T INTERVAL: 450 MS
EKG Q-T INTERVAL: 450 MS
EKG QRS DURATION: 100 MS
EKG QRS DURATION: 94 MS
EKG QTC CALCULATION (BAZETT): 430 MS
EKG QTC CALCULATION (BAZETT): 471 MS
EKG R AXIS: 57 DEGREES
EKG R AXIS: 65 DEGREES
EKG T AXIS: 71 DEGREES
EKG T AXIS: 72 DEGREES
EKG VENTRICULAR RATE: 55 BPM
EKG VENTRICULAR RATE: 66 BPM

## 2025-08-15 ENCOUNTER — TRANSCRIBE ORDERS (OUTPATIENT)
Facility: HOSPITAL | Age: 50
End: 2025-08-15

## 2025-08-15 DIAGNOSIS — Z12.31 OTHER SCREENING MAMMOGRAM: Primary | ICD-10-CM
